# Patient Record
Sex: MALE | Race: WHITE | NOT HISPANIC OR LATINO | URBAN - METROPOLITAN AREA
[De-identification: names, ages, dates, MRNs, and addresses within clinical notes are randomized per-mention and may not be internally consistent; named-entity substitution may affect disease eponyms.]

---

## 2021-04-02 DIAGNOSIS — Z23 ENCOUNTER FOR IMMUNIZATION: ICD-10-CM

## 2021-04-13 ENCOUNTER — IMMUNIZATIONS (OUTPATIENT)
Dept: FAMILY MEDICINE CLINIC | Facility: HOSPITAL | Age: 36
End: 2021-04-13

## 2021-04-13 DIAGNOSIS — Z23 ENCOUNTER FOR IMMUNIZATION: Primary | ICD-10-CM

## 2021-04-13 PROCEDURE — 0001A SARS-COV-2 / COVID-19 MRNA VACCINE (PFIZER-BIONTECH) 30 MCG: CPT

## 2021-04-13 PROCEDURE — 91300 SARS-COV-2 / COVID-19 MRNA VACCINE (PFIZER-BIONTECH) 30 MCG: CPT

## 2021-05-05 ENCOUNTER — IMMUNIZATIONS (OUTPATIENT)
Dept: FAMILY MEDICINE CLINIC | Facility: HOSPITAL | Age: 36
End: 2021-05-05

## 2021-05-05 DIAGNOSIS — Z23 ENCOUNTER FOR IMMUNIZATION: Primary | ICD-10-CM

## 2021-05-05 PROCEDURE — 91300 SARS-COV-2 / COVID-19 MRNA VACCINE (PFIZER-BIONTECH) 30 MCG: CPT

## 2021-05-05 PROCEDURE — 0002A SARS-COV-2 / COVID-19 MRNA VACCINE (PFIZER-BIONTECH) 30 MCG: CPT

## 2024-06-17 ENCOUNTER — AMB VIDEO VISIT (OUTPATIENT)
Dept: OTHER | Facility: HOSPITAL | Age: 39
End: 2024-06-17

## 2024-06-17 VITALS — HEART RATE: 56 BPM | OXYGEN SATURATION: 98 %

## 2024-06-17 DIAGNOSIS — M54.40 ACUTE RIGHT-SIDED LOW BACK PAIN WITH SCIATICA, SCIATICA LATERALITY UNSPECIFIED: Primary | ICD-10-CM

## 2024-06-17 PROCEDURE — ECARE PR SL URGENT CARE VIRTUAL VISIT: Performed by: NURSE PRACTITIONER

## 2024-06-17 RX ORDER — CETIRIZINE HYDROCHLORIDE 10 MG/1
10 TABLET ORAL DAILY
COMMUNITY

## 2024-06-17 RX ORDER — CYCLOBENZAPRINE HCL 10 MG
10 TABLET ORAL 3 TIMES DAILY PRN
Qty: 15 TABLET | Refills: 0 | Status: SHIPPED | OUTPATIENT
Start: 2024-06-17 | End: 2024-06-22

## 2024-06-17 RX ORDER — MULTIVITAMIN
1 TABLET ORAL DAILY
COMMUNITY

## 2024-06-17 RX ORDER — NAPROXEN 500 MG/1
500 TABLET ORAL 2 TIMES DAILY WITH MEALS
Qty: 14 TABLET | Refills: 0 | Status: SHIPPED | OUTPATIENT
Start: 2024-06-17 | End: 2024-06-24

## 2024-06-17 NOTE — CARE ANYWHERE EVISITS
Visit Summary for Ric Ha - Gender: Male - Date of Birth: 1985  Date: [id number] - Duration: 10 minutes  Patient: Ric Ha  Provider: Gamaliel CEDENO    Patient Contact Information  Address  [facility] [facility] [place]; [place] [address]  [phone]    Visit Topics  Back pain, vesta€™t walk [Added By: Self - 2024-06-17]    Triage Questions   What is your current physical address in the event of a medical emergency? Answer []  Are you allergic to any medications? Answer []  Are you now or could you be pregnant? Answer []  Do you have any immune system compromise or chronic lung   disease? Answer []  Do you have any vulnerable family members in the home (infant, pregnant, cancer, elderly)? Answer []     Conversation Transcripts  [0A][0A] [Notification] You are connected with Gamaliel CEDEON, Urgent Care Specialist.[0A][Notification] Ric Ha is located in [place].[0A][Notification] Ric Ha has shared health history...[0A]    Diagnosis  Strain of muscle, fascia and tendon of lower back, init    Procedures  Value: 12075 Code: CPT-4 UNLISTED E&M SERVICE    Medications Prescribed    No prescriptions ordered    Electronically signed by: Gamaliel Nieves(NPI [id number])

## 2024-06-17 NOTE — PATIENT INSTRUCTIONS
Rest.  Alternate ice and warm compresses as discussed.  Start muscle relaxer for muscle spasms, avoid driving while taking medication.  Start naproxen.   Follow up with PCP or Ortho for further evaluation.  Go to ER with worsening symptoms.   Referral placed for comprehensive spine program.    Acute Low Back Pain, Ambulatory Care   GENERAL INFORMATION:   Acute low back pain  is discomfort in your lower back area that lasts for less than 12 weeks. The word acute is used to describe pain that starts suddenly, worsens quickly, and lasts for a short time.  Common symptoms include the following:   Back stiffness or spasms    Pain down the back or side of one leg    Holding yourself in an unusual position or posture to decrease your back pain    Not being able to find a sitting position that is comfortable    Slow increase in your pain for 24 to 48 hours after you stress your back    Tenderness on your lower back or severe pain when you move your back  Seek immediate care for the following symptoms:   Severe pain    Sudden stiffness and heaviness in both buttocks down to both legs    Numbness or weakness in one leg, or pain in both legs    Numbness in your genital area or across your lower back    Unable to control your urine or bowel movements  Treatment for acute low back pain  may include any of the following:  Medicines:      NSAIDs  help decrease swelling and pain or fever. This medicine is available with or without a doctor's order. NSAIDs can cause stomach bleeding or kidney problems in certain people. If you take blood thinner medicine, always ask your healthcare provider if NSAIDs are safe for you. Always read the medicine label and follow directions.    Muscle relaxers  help decrease muscle spasms pain.    Prescription pain medicine  may be given. Ask how to take this medicine safely.    Surgery  may be needed if your pain is severe and other treatments do not work. Surgery may be needed for conditions of the  lumbar spine, such as herniated disc or spinal stenosis.  Manage your symptoms:   Sleep on a firm mattress.  If you do not have a firm mattress, have someone move your mattress to the floor for a few days. A piece of plywood under your mattress can also help make it firmer.    Apply ice  on your lower back for 15 to 20 minutes every hour or as directed. Use an ice pack, or put crushed ice in a plastic bag. Cover it with a towel. Ice helps prevent tissue damage and decreases swelling and pain. You can alternate ice and heat.    Apply heat  on your lower back for 20 to 30 minutes every 2 hours for as many days as directed. Heat helps decrease pain and muscle spasms.    Go to physical therapy.  A physical therapist teaches you exercises to help improve movement and strength, and to decrease pain.  Prevent acute low back pain:   Use proper body mechanics.      Bend at the hips and knees when you  objects. Do not bend from the waist. Use your leg muscles as you lift the load. Do not use your back. Keep the object close to your chest as you lift it. Try not to twist or lift anything above your waist.    Change your position often when you stand for long periods of time. Rest one foot on a small box or footrest, and then switch to the other foot often.    Try not to sit for long periods of time. When you do, sit in a straight-backed chair with your feet flat on the floor. Never reach, pull, or push while you are sitting.    Exercise regularly.  Warm up before you exercise. Do exercises that strengthen your back muscles. Ask about the best exercise plan for you.    Maintain a healthy weight.  Ask your healthcare provider how much you should weigh. Ask him to help you create a weight loss plan if you are overweight.  Follow up with your healthcare provider as directed:  Return for a follow-up visit if you still have pain after 1 to 3 weeks of treatment. You may need to visit an orthopedist if your back pain lasts more  than 6 to 12 weeks. Write down your questions so you remember to ask them during your visits.  CARE AGREEMENT:   You have the right to help plan your care. Learn about your health condition and how it may be treated. Discuss treatment options with your caregivers to decide what care you want to receive. You always have the right to refuse treatment. The above information is an  only. It is not intended as medical advice for individual conditions or treatments. Talk to your doctor, nurse or pharmacist before following any medical regimen to see if it is safe and effective for you.  © 2014 [employer]. Information is for End User's use only and may not be sold, redistributed or otherwise used for commercial purposes. All illustrations and images included in CareNotes® are the copyrighted property of [employer] Inc[employer] or [employer].

## 2024-06-17 NOTE — PROGRESS NOTES
"Required Documentation:  Encounter provider: PAO Banerjee    Identify all parties in room with patient during virtual visit:   Patient and wife     The patient was identified by name and date of birth. Ric Ha was informed that this is a telemedicine visit and that the visit is being conducted through the [employer] platform. He agrees to proceed..  My office door was closed. No one else was in the room.  He acknowledged consent and understanding of privacy and security of the video platform. The patient has agreed to participate and understands they can discontinue the visit at any time.    Verification of patient location:  Patient is located at Home in the following state in which I hold an active license [place]    Patient is aware this is a billable service.     Reason for visit is No chief complaint on file.       Subjective  This is a 38 year old male here today for video visit.  He states on Thursday he \"tweaked\" his back.  He states his son had a baseball tournament over weekend with his son.  He states then yesterday he was doing batting practice with team.  He has been using ice, heat and motrin.  He was pretty mobile yesterday.  He states it goes from he is right buttocks up to middle of shoulder blade.  He has had pain in his back in the past.  He denies any pain on the spine.  He did have xray about 5-6 years ago. He states movements make it worse.  He denies any numbness or tingling in leg.  No loss of bowel or bladder.           No past medical history on file.    No past surgical history on file.     No Known Allergies    Review of Systems   Constitutional: Negative.    Respiratory: Negative.     Musculoskeletal:  Positive for back pain.   Neurological:  Negative for weakness, light-headedness and numbness.   Hematological: Negative.    Psychiatric/Behavioral: Negative.         Video Exam    Vitals:    06/17/24 0827   Pulse: 56   SpO2: 98%       Physical Exam  Constitutional:       " General: He is not in acute distress.     Appearance: Normal appearance. He is not ill-appearing or toxic-appearing.   HENT:      Head: Normocephalic.   Eyes:      Conjunctiva/sclera: Conjunctivae normal.   Pulmonary:      Effort: Pulmonary effort is normal. No respiratory distress.   Musculoskeletal:      Comments: Low back:  No ttp over the spine.  Decreased ROM due to pain.   Skin:     Comments: No rash on head or neck.    Neurological:      Mental Status: He is alert and oriented to person, place, and time.   Psychiatric:         Mood and Affect: Mood normal.         Behavior: Behavior normal.         Thought Content: Thought content normal.         Judgment: Judgment normal.         Visit Time  Total Visit Duration: 8 minutes    Assessment/Plan:    Diagnoses and all orders for this visit:    Acute right-sided low back pain with sciatica, sciatica laterality unspecified  -     cyclobenzaprine (FLEXERIL) 10 mg tablet; Take 1 tablet (10 mg total) by mouth 3 (three) times a day as needed for muscle spasms for up to 5 days  -     naproxen (NAPROSYN) 500 mg tablet; Take 1 tablet (500 mg total) by mouth 2 (two) times a day with meals for 7 days  -     Ambulatory Referral to Comprehensive Spine Program; Future        Patient Instructions   Rest.  Alternate ice and warm compresses as discussed.  Start muscle relaxer for muscle spasms, avoid driving while taking medication.  Start naproxen.   Follow up with PCP or Ortho for further evaluation.  Go to ER with worsening symptoms.   Referral placed for comprehensive spine program.    Acute Low Back Pain, Ambulatory Care   GENERAL INFORMATION:   Acute low back pain  is discomfort in your lower back area that lasts for less than 12 weeks. The word acute is used to describe pain that starts suddenly, worsens quickly, and lasts for a short time.  Common symptoms include the following:   Back stiffness or spasms    Pain down the back or side of one leg    Holding yourself in an  unusual position or posture to decrease your back pain    Not being able to find a sitting position that is comfortable    Slow increase in your pain for 24 to 48 hours after you stress your back    Tenderness on your lower back or severe pain when you move your back  Seek immediate care for the following symptoms:   Severe pain    Sudden stiffness and heaviness in both buttocks down to both legs    Numbness or weakness in one leg, or pain in both legs    Numbness in your genital area or across your lower back    Unable to control your urine or bowel movements  Treatment for acute low back pain  may include any of the following:  Medicines:      NSAIDs  help decrease swelling and pain or fever. This medicine is available with or without a doctor's order. NSAIDs can cause stomach bleeding or kidney problems in certain people. If you take blood thinner medicine, always ask your healthcare provider if NSAIDs are safe for you. Always read the medicine label and follow directions.    Muscle relaxers  help decrease muscle spasms pain.    Prescription pain medicine  may be given. Ask how to take this medicine safely.    Surgery  may be needed if your pain is severe and other treatments do not work. Surgery may be needed for conditions of the lumbar spine, such as herniated disc or spinal stenosis.  Manage your symptoms:   Sleep on a firm mattress.  If you do not have a firm mattress, have someone move your mattress to the floor for a few days. A piece of plywood under your mattress can also help make it firmer.    Apply ice  on your lower back for 15 to 20 minutes every hour or as directed. Use an ice pack, or put crushed ice in a plastic bag. Cover it with a towel. Ice helps prevent tissue damage and decreases swelling and pain. You can alternate ice and heat.    Apply heat  on your lower back for 20 to 30 minutes every 2 hours for as many days as directed. Heat helps decrease pain and muscle spasms.    Go to physical  therapy.  A physical therapist teaches you exercises to help improve movement and strength, and to decrease pain.  Prevent acute low back pain:   Use proper body mechanics.      Bend at the hips and knees when you  objects. Do not bend from the waist. Use your leg muscles as you lift the load. Do not use your back. Keep the object close to your chest as you lift it. Try not to twist or lift anything above your waist.    Change your position often when you stand for long periods of time. Rest one foot on a small box or footrest, and then switch to the other foot often.    Try not to sit for long periods of time. When you do, sit in a straight-backed chair with your feet flat on the floor. Never reach, pull, or push while you are sitting.    Exercise regularly.  Warm up before you exercise. Do exercises that strengthen your back muscles. Ask about the best exercise plan for you.    Maintain a healthy weight.  Ask your healthcare provider how much you should weigh. Ask him to help you create a weight loss plan if you are overweight.  Follow up with your healthcare provider as directed:  Return for a follow-up visit if you still have pain after 1 to 3 weeks of treatment. You may need to visit an orthopedist if your back pain lasts more than 6 to 12 weeks. Write down your questions so you remember to ask them during your visits.  CARE AGREEMENT:   You have the right to help plan your care. Learn about your health condition and how it may be treated. Discuss treatment options with your caregivers to decide what care you want to receive. You always have the right to refuse treatment. The above information is an  only. It is not intended as medical advice for individual conditions or treatments. Talk to your doctor, nurse or pharmacist before following any medical regimen to see if it is safe and effective for you.  © 2014 [employer] Information is for End User's use only and may not  be sold, redistributed or otherwise used for commercial purposes. All illustrations and images included in CareNotes® are the copyrighted property of [employer]M., Inc. or [employer].

## 2024-06-17 NOTE — LETTER
June 17, 2024     Patient: Ric Ha   YOB: 1985   Date of Visit: 6/17/2024       To Whom it May Concern:    Ric Ha is under my professional care. He was seen virtually on 6/17/2024. He may return to work on 06/18/2024 .    If you have any questions or concerns, please don't hesitate to call.         Sincerely,          PAO Banerjee        CC: No Recipients

## 2024-06-18 ENCOUNTER — TELEPHONE (OUTPATIENT)
Dept: PHYSICAL THERAPY | Facility: OTHER | Age: 39
End: 2024-06-18

## 2024-06-18 NOTE — TELEPHONE ENCOUNTER
Call placed to the patient per [facility] referral.    Call went straight to voice mailbox.    V/M left for patient to call back. Phone number and hours of business provided.    This is the 1st attempt to reach the patient. Will defer referral per protocol.    H . I ???

## 2024-06-19 ENCOUNTER — NURSE TRIAGE (OUTPATIENT)
Dept: PHYSICAL THERAPY | Facility: OTHER | Age: 39
End: 2024-06-19

## 2024-06-19 DIAGNOSIS — M54.50 ACUTE RIGHT-SIDED LOW BACK PAIN WITHOUT SCIATICA: Primary | ICD-10-CM

## 2024-06-19 NOTE — TELEPHONE ENCOUNTER
Additional Information   Negative: Has the patient had unexplained weight loss?   Negative: Does the patient have a fever?   Negative: Is the patient experiencing urine retention?   Negative: Is the patient experiencing blood in sputum?   Negative: Is the patient experiencing acute drop foot or paralysis?   Negative: Has the patient experienced major trauma? (fall from height, high speed collision, direct blow to spine) and is also experiencing nausea, light-headedness, or loss of consciousness?   Negative: Is this a chronic condition?    Protocols used: [facility] Protocol    Com[facility] Program was reviewed in detail and what we can provide for their back pain.  Patient is agreeable to being triaged and would like to proceed with Physical Therapy.    Referral was placed for Physical Therapy at the [place] site. Patients information was sent to the  to make evaluation appointment. Patient made aware that the PT office  will be calling to schedule the appointment.  Patient was provided with the phone number to the PT office.    No further questions and/or concerns were voiced by the patient at this time. Patient states understanding of the referral that was placed.    Referral Closed.

## 2024-06-19 NOTE — TELEPHONE ENCOUNTER
"Primary insurance: [facility].    Additional Information   Negative: Is this related to a work injury?   Negative: Is this related to an MVA?   Negative: Are you currently recieving homecare services?    Background - Initial Assessment  Clinical complaint: Right Sided Lower Back Pain that started on Thursday 06/13 and \"tweaked\" aggravated more on Sunday 06/16. Hx of back pain/sciatica \"for awhile\". This new flare up radiates into his right buttocks only. No pain down to his legs or upper body. NKI. No numbness or tingling. Seen by FM (virtual visit) on 06/17 who referred to csp and prescribed muscle relaxer and Naproxen. Patient has an appt to see a chiropractor tomorrow 06/20. Patient states pain is constant if he does not take his medication and intermittent with medication. Worse with movement or certain positions. Patient described pain as spasm and dull. Patient is also using CBD roll on (topical) for the pain.  Date of onset: Thursday 06/13 getting worse on Sunday 06/16 ( new flare up)  Frequency of pain: constant w/o meds, intermittent with meds.  Quality of pain: dull, spasm.    Protocols used: [facility] Protocol    "